# Patient Record
Sex: MALE | Race: WHITE
[De-identification: names, ages, dates, MRNs, and addresses within clinical notes are randomized per-mention and may not be internally consistent; named-entity substitution may affect disease eponyms.]

---

## 2022-07-30 ENCOUNTER — HOSPITAL ENCOUNTER (EMERGENCY)
Dept: HOSPITAL 4 - SED | Age: 77
Discharge: HOME | End: 2022-07-30
Payer: COMMERCIAL

## 2022-07-30 VITALS — WEIGHT: 116 LBS | HEIGHT: 66 IN | BODY MASS INDEX: 18.64 KG/M2

## 2022-07-30 VITALS — SYSTOLIC BLOOD PRESSURE: 117 MMHG

## 2022-07-30 VITALS — SYSTOLIC BLOOD PRESSURE: 140 MMHG

## 2022-07-30 DIAGNOSIS — I82.812: Primary | ICD-10-CM

## 2022-07-30 DIAGNOSIS — R22.42: ICD-10-CM

## 2022-07-30 NOTE — NUR
RECEIVED PT AAOX4. PT HAS L UPPER FOOT SWELLING, DISCOLORATION. SITE NOT HOT, 
FOOT AND LOWER ANKLE TRACE EDEMA. RESP E/U. NO R/A. DENIES N/V/C/D. PT STATES 
HE HAS FOOT PAIN 2/10, NO MEDICATION NEEDED.

## 2022-07-30 NOTE — NUR
DR. KOWALSKI AT BEDSIDE PT WILL HAVE U/S FOR L FOOT SWELLING,  SUSPECTS 
SUPERFICIAL BLOOD CLOT. PT AGREES TO POC.